# Patient Record
Sex: FEMALE | Race: WHITE | NOT HISPANIC OR LATINO | URBAN - METROPOLITAN AREA
[De-identification: names, ages, dates, MRNs, and addresses within clinical notes are randomized per-mention and may not be internally consistent; named-entity substitution may affect disease eponyms.]

---

## 2018-01-10 NOTE — PROGRESS NOTES
Assessment    1  Allergic rhinitis (477 9) (J30 9)   2  School physical exam (V70 5) (Z02 0)   3  Immunity status testing (V72 61) (Z01 84)    Plan  Immunity status testing    · (LC) Measles/Mumps/Rubella Immunity; Status:Active; Requested for:[id number];    · (Q) HEPATITIS B IMMUNITY PANEL; Status:Active; Requested for:[id number];     Discussion/Summary  health maintenance visit healthy adult female Currently, she eats an adequate diet and has an inadequate exercise regimen  Breast cancer screening: self breast exam technique was taught, monthly self breast exam was advised and breast cancer screening is not indicated  Colorectal cancer screening: colorectal cancer screening is current  Osteoporosis screening: bone mineral density testing is not indicated  The Need immunization records from previous PCP  Patient discussion: discussed with the patient  Will have patient sign consent to obtain records from previous PCP for immunization review  For school physical requirements, will check labs for immunity to MMR and Hepatitis B  Will complete physical paperwork and immunization records, emailed to me by the patient after complete records obtained and will mail to her at current address  Seasonal allergies and asthma well controlled, recommended use of nonsedating antihistamine and nasal steroid during problem seasons  Follow up yearly or as needed  Possible side effects of new medications were reviewed with the patient/guardian today  The patient was counseled regarding instructions for management, risk factor reductions, patient and family education, impressions, risks and benefits of treatment options  Chief Complaint  Patient presents for school physical      History of Present Illness  HM, Adult Female: The patient is being seen for a health maintenance evaluation  Social History: Work status: working full time and occupation: Teacher, PhD student    The patient has never smoked cigarettes and has never used smokeless tobacco  She reports never drinking alcohol  The patient has no concerns about alcohol abuse  She has never used illicit drugs  General Health: The patient's health since the last visit is described as good  She has regular dental visits  She denies vision problems  She denies hearing loss  Immunizations status: up to date  Lifestyle:  She consumes a diverse and healthy diet  She has weight concerns  She does not exercise regularly  Reproductive health: the patient is premenopausal   she reports normal menses  Screening: cancer screening reviewed and current  metabolic screening reviewed and current  risk screening reviewed and current  HPI: 28year old female presents for school physical; 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[address]; also teaching part time  Has past medical history of allergic rhinitis and asthma, both of which are well controlled  Not taking any controller medication for either condition; has some nasal congestion and sinus pressure last weekend which has since resolved, took mucinex for a few days  No rhinorrhea or purulent nasal discharge, no SOB, no cough  Seasonal Allergy (Brief): The patient is being seen for a routine clinic follow-up of seasonal allergy  Symptoms:  no itching eyes, no watery eyes, no itching nose, no runny nose, no nasal congestion, no postnasal drainage, no cough, no sneezing and no sinus pressure  The patient is currently asymptomatic  Associated symptoms:  no hives, no head congestion, no dyspnea, no fatigue and no impaired school performance  The patient is not currently being treated for this problem  Review of Systems    Constitutional: no fever, no recent weight gain, no chills and no recent weight loss  Eyes: no eyesight problems and eyes not red  ENT: no earache, no sore throat, no hearing loss, no nasal discharge and no hoarseness     Cardiovascular: no chest pain, no palpitations and no lower extremity edema  Respiratory: no shortness of breath, no cough and no wheezing  Gastrointestinal: no nausea, no vomiting and no diarrhea  Genitourinary: no unexplained vaginal bleeding  Musculoskeletal: no arthralgias and no myalgias  Integumentary: no rashes and no skin lesions  Neurological: no headache  Psychiatric: no sleep disturbances  Endocrine: no muscle weakness  Hematologic/Lymphatic: no swollen glands  Other Symptoms: All other review of systems negative  ROS reviewed  Active Problems    1  Allergic rhinitis (477 9) (J30 9)   2  Encounter for preconception consultation (V26 49) (Z31 69)   3  Encounter to establish care with new doctor (V65 8) (Z71 89)   4  Esophageal reflux (530 81) (K21 9)   5  Immunity status testing (V72 61) (Z01 84)   6  Irregular menses (626 4) (N92 6)   7  Need for immunization against influenza (V04 81) (Z23)   8  Overweight (278 02) (E66 3)   9  School physical exam (V70 5) (Z02 0)    Past Medical History    · History of Asthma (493 90) (J45 909)    Surgical History    · History of Tonsillectomy With Adenoidectomy    Family History  Mother    · No pertinent family history  Family History    · Family history of Diabetes Mellitus (V18 0)   · Family history of Hypertension (V17 49)    Social History    · Denied: History of Alcohol Use (History)   · Never A Smoker    Current Meds   1  Fish Oil OIL; Therapy: (Recorded:[id number]) to Recorded   2  One-A-Day Womens Prenatal 28-0 8 & 223 MG Oral Miscellaneous; Therapy: (Recorded:44Liq9625) to Recorded    Allergies    1  PriLOSEC OTC TBEC   2  Albuterol AERS    3   Latex    Vitals   Recorded: [id number] 11:10FX   Systolic 306   Diastolic 70   Heart Rate 72   Respiration 16   Temperature 96 8 F   Pain Scale 0   Height 5 ft 8 4 in   Weight 203 lb 3 2 oz   BMI Calculated 30 54   BSA Calculated 2 07     Physical Exam    Constitutional   General appearance: Abnormal   well developed, appears healthy, overweight and well hydrated  Head and Face   Head and face: Normal     Palpation of the face and sinuses: No sinus tenderness  Eyes   Conjunctiva and lids: No swelling, erythema or discharge  Pupils and irises: Equal, round, reactive to light  Ears, Nose, Mouth, and Throat   External inspection of ears and nose: Normal     Otoscopic examination: Tympanic membranes translucent with normal light reflex  Canals patent without erythema  Hearing: Normal     Nasal mucosa, septum, and turbinates: Normal without edema or erythema  Lips, teeth, and gums: Normal, good dentition  Oropharynx: Normal with no erythema, edema, exudate or lesions  Neck   Neck: Supple, symmetric, trachea midline, no masses  Thyroid: Normal, no thyromegaly  Pulmonary   Respiratory effort: No increased work of breathing or signs of respiratory distress  Auscultation of lungs: Clear to auscultation  Cardiovascular   Auscultation of heart: Normal rate and rhythm, normal S1 and S2, no murmurs  Peripheral vascular exam: Normal     Examination of extremities for edema and/or varicosities: Normal     Abdomen   Abdomen: Non-tender, no masses  Lymphatic   Palpation of lymph nodes in neck: No lymphadenopathy  Musculoskeletal   Gait and station: Normal     Digits and nails: Normal without clubbing or cyanosis  Range of motion: Normal     Muscle strength/tone: Normal     Skin   Skin and subcutaneous tissue: Normal without rashes or lesions  Neurologic   Cranial nerves: Cranial nerves II-XII intact  Psychiatric   Judgment and insight: Normal     Orientation to person, place, and time: Normal     Recent and remote memory: Intact  Mood and affect: Normal        Attending Note  Attending Note: Attending Note: I discussed the case with the Resident and reviewed the Resident's note, I supervised the Resident and I agree with the Resident management plan as it was presented to me   Level of Participation: I was present in clinic, but did not examine the patient  I agree with the Resident's note        Signatures   Electronically signed by : Matt Harrington DO; Sep 14 2016 11:24AM EST                       (Author)    Electronically signed by : FELICITA Gonzalez ; Sep 21 2016  2:50PM EST                       (Author)

## 2022-10-18 ENCOUNTER — OFFICE VISIT (OUTPATIENT)
Dept: FAMILY MEDICINE | Facility: CLINIC | Age: 37
End: 2022-10-18
Payer: COMMERCIAL

## 2022-10-18 VITALS — RESPIRATION RATE: 16 BRPM | WEIGHT: 283 LBS | BODY MASS INDEX: 42.89 KG/M2 | HEIGHT: 68 IN | TEMPERATURE: 97.1 F

## 2022-10-18 DIAGNOSIS — Z00.00 ENCOUNTER FOR GENERAL ADULT MEDICAL EXAMINATION WITHOUT ABNORMAL FINDINGS: Primary | ICD-10-CM

## 2022-10-18 DIAGNOSIS — Z23 IMMUNIZATION DUE: ICD-10-CM

## 2022-10-18 DIAGNOSIS — Z11.59 ENCOUNTER FOR HEPATITIS C SCREENING TEST FOR LOW RISK PATIENT: ICD-10-CM

## 2022-10-18 DIAGNOSIS — Z13.220 LIPID SCREENING: ICD-10-CM

## 2022-10-18 DIAGNOSIS — F41.8 SITUATIONAL ANXIETY: ICD-10-CM

## 2022-10-18 PROCEDURE — 99385 PREV VISIT NEW AGE 18-39: CPT | Mod: 25 | Performed by: STUDENT IN AN ORGANIZED HEALTH CARE EDUCATION/TRAINING PROGRAM

## 2022-10-18 PROCEDURE — 3008F BODY MASS INDEX DOCD: CPT | Performed by: STUDENT IN AN ORGANIZED HEALTH CARE EDUCATION/TRAINING PROGRAM

## 2022-10-18 PROCEDURE — 90686 IIV4 VACC NO PRSV 0.5 ML IM: CPT | Performed by: STUDENT IN AN ORGANIZED HEALTH CARE EDUCATION/TRAINING PROGRAM

## 2022-10-18 PROCEDURE — 90471 IMMUNIZATION ADMIN: CPT | Performed by: STUDENT IN AN ORGANIZED HEALTH CARE EDUCATION/TRAINING PROGRAM

## 2022-10-18 RX ORDER — CLINDAMYCIN AND BENZOYL PEROXIDE 1 %-5 %
KIT TOPICAL 2 TIMES DAILY
COMMUNITY

## 2022-10-18 ASSESSMENT — PAIN SCALES - GENERAL: PAINLEVEL: 0-NO PAIN

## 2022-10-18 ASSESSMENT — PATIENT HEALTH QUESTIONNAIRE - PHQ9: SUM OF ALL RESPONSES TO PHQ9 QUESTIONS 1 & 2: 0

## 2022-10-18 NOTE — ASSESSMENT & PLAN NOTE
Sheyla Estrada is a 37 y.o. female who presents today for annual physical. Age appropriate screening and routine maintenance reviewed and updated today. Will follow up in 2 weeks to review labs.

## 2022-10-18 NOTE — PROGRESS NOTES
NEW PATIENT VISIT - ANNUAL PHYSICAL    Saranya Burns D.O.  [facility]  [address]  [place] [address]  [phone]      HISTORY OF PRESENT ILLNESS        Chief Complaint   Patient presents with    Annual Exam     NP PE, wants flu vaccine today. GYN [facility] Dr Saranya Perez        HPI:  Sheyla Estrada is a 37 y.o. female here to establish care and for annual physical    Discussed heightened anxiety s/p birth of daughter a few months ago. Getting very little sleep. She describes feeling overwhelmed by care taking responsibilities of baby and managing the house. 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 She now feels highly anxious, all of the time. She is established with a therapist. Manages anxiety through meditation. Not interested in medication for anxiety. No negative feelings towards baby.    Immunizations:  Would like flu shot today  Gyn: Follows with Dr. Perez at [facility]  Pap Smear: during pregnancy, delivered 6/7/22  Contraception: infertility issues, conceived via IVF so not using contraception  Breastfeeding currently    Diet: high protein (chicken, pecans, almonds, avacados), veges, carrots and hummus, broccoli and cauliflower  Exercise: walks with baby twice per day, about 2 miles per day  Occupation: , PhD    PAST MEDICAL AND SURGICAL HISTORY        Past Medical History:   Diagnosis Date    In vitro fertilization        Past Surgical History:   Procedure Laterality Date    ADENOIDECTOMY      TONSILLECTOMY       MEDICATIONS          Current Outpatient Medications:     clindamycin-benzoyl peroxide 1-5 % gel with pump, Apply topically 2 (two) times a day., Disp: , Rfl:   ALLERGIES        Albuterol, Omeprazole, and Latex  FAMILY HISTORY        Family History  "  Problem Relation Age of Onset    Hypothyroidism Biological Sister     Breast cancer Maternal Grandmother      SOCIAL/ TOBACCO HISTORY        Social History     Tobacco Use    Smoking status: Never    Smokeless tobacco: Never   Substance Use Topics    Alcohol use: Not Currently    Drug use: Never     REVIEW OF SYSTEMS        All systems reviewed and negative except as otherwise stated in HPI   PHYSICAL EXAMINATION      Visit Vitals  Temp 36.2 °C (97.1 °F) (Temporal)   Resp 16   Ht 1.727 m (5' 8\")   Wt 128 kg (283 lb)   Breastfeeding Yes   BMI 43.03 kg/m²        Body mass index is 43.03 kg/m².     Physical Exam  Vitals reviewed.   Constitutional:       General: She is not in acute distress.     Appearance: She is not ill-appearing or toxic-appearing.   Cardiovascular:      Rate and Rhythm: Normal rate and regular rhythm.      Pulses: Normal pulses.      Heart sounds: No murmur heard.  Pulmonary:      Effort: Pulmonary effort is normal. No respiratory distress.      Breath sounds: No wheezing, rhonchi or rales.   Neurological:      Mental Status: She is alert.            ASSESSMENT AND PLAN   Diagnoses and all orders for this visit:    Encounter for general adult medical examination without abnormal findings (Primary)  Assessment & Plan:  Sheyla Estrada is a 37 y.o. female who presents today for annual physical. Age appropriate screening and routine maintenance reviewed and updated today. Will follow up in 2 weeks to review labs.    Orders:  -     Comprehensive metabolic panel; Future    Encounter for hepatitis C screening test for low risk patient  -     Hepatitis C antibody; Future    Lipid screening  -     Lipid panel; Future    Immunization due  -     Influenza vaccine quadrivalent preservative free 6 mon and older IM (FluLaval)    Situational anxiety  Assessment & Plan:  No evidence of postpartum depression.  She has high anxiety around the care of her baby and her 's health and his ability to " contribute at home, which have led to increased tension and conflict within the marriage. Her anxiety level is expected in the setting of her life stressors and is likely exacerbated by sleep deprivation and hormonal changes post pregnancy.  I encouraged her to continue therapy and agree with plan to return to marriage counseling to help improve the communication with her . I advised her to let me know if she is interested in medication in the future.        Return in about 2 weeks (around 11/1/2022) for Lab follow up.       Saranya Burns,   10/18/2022

## 2022-10-18 NOTE — ASSESSMENT & PLAN NOTE
No evidence of postpartum depression.  She has high anxiety around the care of her baby and her 's health and his ability to contribute at home, which have led to increased tension and conflict within the marriage. Her anxiety level is expected in the setting of her life stressors and is likely exacerbated by sleep deprivation and hormonal changes post pregnancy.  I encouraged her to continue therapy and agree with plan to return to marriage counseling to help improve the communication with her . I advised her to let me know if she is interested in medication in the future.

## 2022-10-23 LAB
ALBUMIN SERPL-MCNC: 4.2 G/DL (ref 3.8–4.8)
ALBUMIN/GLOB SERPL: 1.5 {RATIO} (ref 1.2–2.2)
ALP SERPL-CCNC: 121 IU/L (ref 44–121)
ALT SERPL-CCNC: 21 IU/L (ref 0–32)
AST SERPL-CCNC: 23 IU/L (ref 0–40)
BILIRUB SERPL-MCNC: 0.3 MG/DL (ref 0–1.2)
BUN SERPL-MCNC: 16 MG/DL (ref 6–20)
BUN/CREAT SERPL: 20 (ref 9–23)
CALCIUM SERPL-MCNC: 9.3 MG/DL (ref 8.7–10.2)
CHLORIDE SERPL-SCNC: 105 MMOL/L (ref 96–106)
CHOLEST SERPL-MCNC: 158 MG/DL (ref 100–199)
CO2 SERPL-SCNC: 24 MMOL/L (ref 20–29)
CREAT SERPL-MCNC: 0.82 MG/DL (ref 0.57–1)
EGFRCR SERPLBLD CKD-EPI 2021: 95 ML/MIN/1.73
GLOBULIN SER CALC-MCNC: 2.8 G/DL (ref 1.5–4.5)
GLUCOSE SERPL-MCNC: 77 MG/DL (ref 70–99)
HCV AB S/CO SERPL IA: <0.1 S/CO RATIO (ref 0–0.9)
HDLC SERPL-MCNC: 49 MG/DL
LDLC SERPL CALC-MCNC: 96 MG/DL (ref 0–99)
POTASSIUM SERPL-SCNC: 4.5 MMOL/L (ref 3.5–5.2)
PROT SERPL-MCNC: 7 G/DL (ref 6–8.5)
SODIUM SERPL-SCNC: 142 MMOL/L (ref 134–144)
TRIGL SERPL-MCNC: 68 MG/DL (ref 0–149)
VLDLC SERPL CALC-MCNC: 13 MG/DL (ref 5–40)

## 2022-11-04 ENCOUNTER — OFFICE VISIT (OUTPATIENT)
Dept: FAMILY MEDICINE | Facility: CLINIC | Age: 37
End: 2022-11-04
Payer: COMMERCIAL

## 2022-11-04 VITALS
TEMPERATURE: 98 F | SYSTOLIC BLOOD PRESSURE: 115 MMHG | OXYGEN SATURATION: 97 % | BODY MASS INDEX: 42.89 KG/M2 | WEIGHT: 283 LBS | HEIGHT: 68 IN | HEART RATE: 85 BPM | DIASTOLIC BLOOD PRESSURE: 80 MMHG | RESPIRATION RATE: 16 BRPM

## 2022-11-04 DIAGNOSIS — F41.8 SITUATIONAL ANXIETY: ICD-10-CM

## 2022-11-04 PROCEDURE — 99214 OFFICE O/P EST MOD 30 MIN: CPT | Performed by: STUDENT IN AN ORGANIZED HEALTH CARE EDUCATION/TRAINING PROGRAM

## 2022-11-04 PROCEDURE — 3008F BODY MASS INDEX DOCD: CPT | Performed by: STUDENT IN AN ORGANIZED HEALTH CARE EDUCATION/TRAINING PROGRAM

## 2022-11-04 NOTE — ASSESSMENT & PLAN NOTE
Her mood today is much improved.  She reports getting more sleep and more help from her .  She seems to be managing her many life stressors quite well at this point.  Continue to monitor her mood and anxiety.

## 2022-11-04 NOTE — ASSESSMENT & PLAN NOTE
Her weight is stable from her previous visit.  I am happy to hear that she walks 5 miles daily and has a generally healthy diet, although I hope to identify lifestyle factors that may be serving as barriers to weight loss.  I will continue to encourage her to promote weight loss and overall cardiovascular health. While she is breast-feeding, she is not a candidate for GLP-1.  Once she is no longer breast-feeding or contemplating another pregnancy, could consider a GLP-1 for her at some point.

## 2022-11-04 NOTE — PROGRESS NOTES
Saranya Burns D.O.  [facility]  [address]  [place] [address]  [phone]      HISTORY OF PRESENT ILLNESS        Chief Complaint   Patient presents with    Follow-up     Review Labwork        HPI:  Sheyla Estrada is a 37 y.o. female presents to discuss her labs and follow up anxiety.    Anxiety is significantly improved since her last office visit a little over 2 weeks ago  She is getting better sleep now and communication between her and her , Matheus, is also getting better.  She has Matheus R trying to reestablish with a marriage counselor, although with her previous therapist offices closing so looking for another marriage counselor  She feels that her  has been doing more for himself, which has been relieving some of her burden as well  Work is going well  Labs, including CMP and lipid panel are wnl    We also discussed her labs today which were all within normal limits.  CMP and lipid panel are normal.  We discussed her diet and exercise in the context of her BMI of 43.  She feels she has a fairly healthy diet, eats vegetables, does not snack.  She walks about 5 miles a day with baby, 2-1/2 miles in the morning and 2 and half miles in the evening.  She is currently breast-feeding and planning to continue breast-feeding possibly up to 18 months.  She does state that she has had weight loss success with diet and lifestyle changes prior to her pregnancy and feels she can do that again.  However she does state that she tends to plateau in that weight loss.  She and her  are not sure yet if they are going to want another child, as they would like to see how the first year goes with first baby.       Current Outpatient Medications:     clindamycin-benzoyl peroxide 1-5 % gel with pump, Apply topically 2 (two) times a day., Disp: , Rfl:      ROS        All systems reviewed and negative except as otherwise stated in HPI   PHYSICAL EXAMINATION     "  Visit Vitals  /80 (BP Location: Left upper arm, Patient Position: Sitting)   Pulse 85   Temp 36.7 °C (98 °F) (Temporal)   Resp 16   Ht 1.727 m (5' 8\")   Wt 128 kg (283 lb)   SpO2 97%   BMI 43.03 kg/m²      Body mass index is 43.03 kg/m².     Wt Readings from Last 3 Encounters:   11/04/22 128 kg (283 lb)   10/18/22 128 kg (283 lb)      BMI Readings from Last 3 Encounters:   11/04/22 43.03 kg/m²   10/18/22 43.03 kg/m²        Physical Exam  Vitals reviewed.   Constitutional:       General: She is not in acute distress.     Appearance: Normal appearance. She is not ill-appearing or toxic-appearing.   Cardiovascular:      Rate and Rhythm: Normal rate and regular rhythm.      Heart sounds: No murmur heard.  Pulmonary:      Effort: Pulmonary effort is normal. No respiratory distress.      Breath sounds: No wheezing, rhonchi or rales.   Neurological:      Mental Status: She is alert.   Psychiatric:         Mood and Affect: Mood normal.         Behavior: Behavior normal.          ASSESSMENT AND PLAN   Diagnoses and all orders for this visit:    BMI 40.0-44.9, adult (CMS/[facility]) (Primary)  Assessment & Plan:  Her weight is stable from her previous visit.  I am happy to hear that she walks 5 miles daily and has a generally healthy diet, although I hope to identify lifestyle factors that may be serving as barriers to weight loss.  I will continue to encourage her to promote weight loss and overall cardiovascular health. While she is breast-feeding, she is not a candidate for GLP-1.  Once she is no longer breast-feeding or contemplating another pregnancy, could consider a GLP-1 for her at some point.      Situational anxiety  Assessment & Plan:  Her mood today is much improved.  She reports getting more sleep and more help from her .  She seems to be managing her many life stressors quite well at this point.  Continue to monitor her mood and anxiety.         Return in about 1 year (around 11/4/2023) for annual " physical.     I spent 30 minutes on this date of service performing the following activities: obtaining history, performing examination, entering orders, documenting, preparing for visit, obtaining / reviewing records, providing counseling and education, independently reviewing study/studies and communicating results.      Saranya Burns DO  11/4/2022

## 2023-11-17 ENCOUNTER — OFFICE VISIT (OUTPATIENT)
Dept: FAMILY MEDICINE | Facility: CLINIC | Age: 38
End: 2023-11-17
Payer: COMMERCIAL

## 2023-11-17 VITALS
SYSTOLIC BLOOD PRESSURE: 110 MMHG | TEMPERATURE: 97.8 F | RESPIRATION RATE: 16 BRPM | HEART RATE: 94 BPM | DIASTOLIC BLOOD PRESSURE: 78 MMHG | BODY MASS INDEX: 42.24 KG/M2 | WEIGHT: 277.8 LBS | OXYGEN SATURATION: 99 %

## 2023-11-17 DIAGNOSIS — Z00.00 ENCOUNTER FOR GENERAL ADULT MEDICAL EXAMINATION WITHOUT ABNORMAL FINDINGS: Primary | ICD-10-CM

## 2023-11-17 DIAGNOSIS — Z13.220 LIPID SCREENING: ICD-10-CM

## 2023-11-17 DIAGNOSIS — Z01.84 IMMUNITY STATUS TESTING: ICD-10-CM

## 2023-11-17 PROCEDURE — 90686 IIV4 VACC NO PRSV 0.5 ML IM: CPT | Performed by: STUDENT IN AN ORGANIZED HEALTH CARE EDUCATION/TRAINING PROGRAM

## 2023-11-17 PROCEDURE — 99395 PREV VISIT EST AGE 18-39: CPT | Mod: 25 | Performed by: STUDENT IN AN ORGANIZED HEALTH CARE EDUCATION/TRAINING PROGRAM

## 2023-11-17 PROCEDURE — 90471 IMMUNIZATION ADMIN: CPT | Performed by: STUDENT IN AN ORGANIZED HEALTH CARE EDUCATION/TRAINING PROGRAM

## 2023-11-17 PROCEDURE — 3008F BODY MASS INDEX DOCD: CPT | Performed by: STUDENT IN AN ORGANIZED HEALTH CARE EDUCATION/TRAINING PROGRAM

## 2023-11-17 RX ORDER — ACETAMINOPHEN 500 MG
TABLET ORAL
COMMUNITY

## 2023-11-17 ASSESSMENT — PATIENT HEALTH QUESTIONNAIRE - PHQ9: SUM OF ALL RESPONSES TO PHQ9 QUESTIONS 1 & 2: 0

## 2023-11-17 NOTE — PROGRESS NOTES
ESTABLISHED PATIENT PHYSICAL EXAM    Saranya Burns D.O.  [facility]  [address]  [address]  [place] [address]  [phone]          Chief Complaint   Patient presents with    Annual Exam        HPI:  Sheyla Angel is a 38 y.o. female here for annual physical.    Reports anxiety has been better lately, feeling more herself, especially since her , Low has been making progress with weight loss and being more helpful at home.     Immunizations: would like flu, holding off on covid  Gyn: [facility] in [place]  Pap Smear: gets annually  Menses: regular  Contraception: none    Diet: carb cycling, macros  Exercise: walking with baby, but no regimented exercise  Occupation: phd,   Home: lives with  and daughter     PAST MEDICAL AND SURGICAL HISTORY        Past Medical History:   Diagnosis Date    In vitro fertilization        Past Surgical History:   Procedure Laterality Date    ADENOIDECTOMY      TONSILLECTOMY       MEDICATIONS        Current Outpatient Medications on File Prior to Visit   Medication Sig    ascorbic acid (VITAMIN C ORAL) Vitamin C    calcium carb-vitamin D3-vit K2 500 mg calcium- 200 unit-90 mcg tablet Vitamin K2-Vitamin D3    calcium carbonate (CALCIUM 500 ORAL) Calcium    clindamycin-benzoyl peroxide 1-5 % gel with pump Apply topically 2 (two) times a day.    docosahexaenoic acid/epa (FISH OIL ORAL) Fish Oil    Lactobacillus acidophilus (PROBIOTIC) 10 billion cell capsule Probiotic    prenatal no115/iron/folic acid (PRENATAL 19 ORAL) Prenatal    vit B complex 100 no.2/herbs (VITAMIN B COMPLEX 100  2-HERBS ORAL) Vitamin B Complex 100     No current facility-administered medications on file prior to visit.        ALLERGIES        Albuterol, Omeprazole, and Latex  FAMILY HISTORY        Family History   Problem Relation Age of Onset    Hypothyroidism Biological Sister     Breast cancer Maternal Grandmother      SOCIAL/  TOBACCO HISTORY        Social History     Tobacco Use    Smoking status: Never    Smokeless tobacco: Never   Substance Use Topics    Alcohol use: Not Currently    Drug use: Never     REVIEW OF SYSTEMS        All systems reviewed and negative except as otherwise stated in HPI   PHYSICAL EXAMINATION      Visit Vitals  /78 (BP Location: Right upper arm, Patient Position: Sitting)   Pulse 94   Temp 36.6 °C (97.8 °F) (Temporal)   Resp 16   Wt 126 kg (277 lb 12.8 oz)   LMP 10/23/2023 (Exact Date)   SpO2 99%   Breastfeeding Yes   BMI 42.24 kg/m²        Body mass index is 42.24 kg/m².     Wt Readings from Last 3 Encounters:   11/17/23 126 kg (277 lb 12.8 oz)   11/04/22 128 kg (283 lb)   10/18/22 128 kg (283 lb)        BMI Readings from Last 3 Encounters:   11/17/23 42.24 kg/m²   11/04/22 43.03 kg/m²   10/18/22 43.03 kg/m²        Physical Exam  Vitals reviewed.   Constitutional:       General: She is not in acute distress.     Appearance: Normal appearance. She is not ill-appearing or toxic-appearing.   HENT:      Right Ear: Tympanic membrane normal. There is no impacted cerumen.      Left Ear: Tympanic membrane normal. There is no impacted cerumen.      Mouth/Throat:      Pharynx: No oropharyngeal exudate or posterior oropharyngeal erythema.   Cardiovascular:      Rate and Rhythm: Normal rate and regular rhythm.      Heart sounds: No murmur heard.  Pulmonary:      Effort: Pulmonary effort is normal. No respiratory distress.      Breath sounds: No stridor. No wheezing, rhonchi or rales.   Abdominal:      General: There is no distension.      Palpations: Abdomen is soft.      Tenderness: There is no abdominal tenderness.   Musculoskeletal:      Right lower leg: No edema.      Left lower leg: No edema.   Neurological:      Mental Status: She is alert.      Deep Tendon Reflexes: Reflexes normal.   Psychiatric:         Mood and Affect: Mood normal.         Behavior: Behavior normal.          ASSESSMENT AND PLAN    Diagnoses and all orders for this visit:    Encounter for general adult medical examination without abnormal findings (Primary)  Assessment & Plan:  Sheyla Angel is a 38 y.o. female who presents today for annual physical. Age appropriate screening and routine maintenance reviewed and updated today.     Orders:  -     Comprehensive metabolic panel; Future    Immunity status testing  -     Hepatitis B surface antibody; Future    Lipid screening  -     Lipid panel; Future    BMI 40.0-44.9, adult (CMS/[facility])  Assessment & Plan:  Cont weight loss efforts. Aim for 30 min per day of moderate intensity activity      Other orders  -     Influenza vaccine quadrivalent preservative free 6 mon and older IM (FluLaval)         Current Outpatient Medications:     ascorbic acid (VITAMIN C ORAL), Vitamin C, Disp: , Rfl:     calcium carb-vitamin D3-vit K2 500 mg calcium- 200 unit-90 mcg tablet, Vitamin K2-Vitamin D3, Disp: , Rfl:     calcium carbonate (CALCIUM 500 ORAL), Calcium, Disp: , Rfl:     clindamycin-benzoyl peroxide 1-5 % gel with pump, Apply topically 2 (two) times a day., Disp: , Rfl:     docosahexaenoic acid/epa (FISH OIL ORAL), Fish Oil, Disp: , Rfl:     Lactobacillus acidophilus (PROBIOTIC) 10 billion cell capsule, Probiotic, Disp: , Rfl:     prenatal no115/iron/folic acid (PRENATAL 19 ORAL), Prenatal, Disp: , Rfl:     vit B complex 100 no.2/herbs (VITAMIN B COMPLEX 100  2-HERBS ORAL), Vitamin B Complex 100, Disp: , Rfl:     Return in about 1 year (around 11/17/2024) for annual physical or sooner as needed.       Saranya Burns,   11/17/2023

## 2023-11-18 LAB
ALBUMIN SERPL-MCNC: 4.5 G/DL (ref 3.9–4.9)
ALBUMIN/GLOB SERPL: 2 {RATIO} (ref 1.2–2.2)
ALP SERPL-CCNC: 91 IU/L (ref 44–121)
ALT SERPL-CCNC: 12 IU/L (ref 0–32)
AST SERPL-CCNC: 14 IU/L (ref 0–40)
BILIRUB SERPL-MCNC: 0.4 MG/DL (ref 0–1.2)
BUN SERPL-MCNC: 17 MG/DL (ref 6–20)
BUN/CREAT SERPL: 22 (ref 9–23)
CALCIUM SERPL-MCNC: 9.4 MG/DL (ref 8.7–10.2)
CHLORIDE SERPL-SCNC: 105 MMOL/L (ref 96–106)
CHOLEST SERPL-MCNC: 149 MG/DL (ref 100–199)
CO2 SERPL-SCNC: 22 MMOL/L (ref 20–29)
CREAT SERPL-MCNC: 0.79 MG/DL (ref 0.57–1)
EGFRCR SERPLBLD CKD-EPI 2021: 99 ML/MIN/1.73
GLOBULIN SER CALC-MCNC: 2.3 G/DL (ref 1.5–4.5)
GLUCOSE SERPL-MCNC: 82 MG/DL (ref 70–99)
HBV SURFACE AB SER QL: REACTIVE
HDLC SERPL-MCNC: 49 MG/DL
LDLC SERPL CALC-MCNC: 88 MG/DL (ref 0–99)
POTASSIUM SERPL-SCNC: 5.3 MMOL/L (ref 3.5–5.2)
PROT SERPL-MCNC: 6.8 G/DL (ref 6–8.5)
SODIUM SERPL-SCNC: 141 MMOL/L (ref 134–144)
TRIGL SERPL-MCNC: 57 MG/DL (ref 0–149)
VLDLC SERPL CALC-MCNC: 12 MG/DL (ref 5–40)

## 2024-06-19 ENCOUNTER — TELEPHONE (OUTPATIENT)
Dept: FAMILY MEDICINE | Facility: CLINIC | Age: 39
End: 2024-06-19
Payer: COMMERCIAL

## 2024-06-20 ENCOUNTER — TELEPHONE (OUTPATIENT)
Dept: FAMILY MEDICINE | Facility: CLINIC | Age: 39
End: 2024-06-20

## 2024-06-20 ENCOUNTER — OFFICE VISIT (OUTPATIENT)
Dept: FAMILY MEDICINE | Facility: CLINIC | Age: 39
End: 2024-06-20
Payer: COMMERCIAL

## 2024-06-20 VITALS
TEMPERATURE: 97.6 F | SYSTOLIC BLOOD PRESSURE: 110 MMHG | OXYGEN SATURATION: 99 % | BODY MASS INDEX: 40.85 KG/M2 | HEART RATE: 87 BPM | RESPIRATION RATE: 16 BRPM | DIASTOLIC BLOOD PRESSURE: 70 MMHG | HEIGHT: 69 IN | WEIGHT: 275.8 LBS

## 2024-06-20 DIAGNOSIS — A69.20 ERYTHEMA CHRONICUM MIGRANS: Primary | ICD-10-CM

## 2024-06-20 DIAGNOSIS — A69.20 ERYTHEMA CHRONICUM MIGRANS: ICD-10-CM

## 2024-06-20 DIAGNOSIS — L98.9 SKIN LESION: ICD-10-CM

## 2024-06-20 PROCEDURE — 3008F BODY MASS INDEX DOCD: CPT | Performed by: FAMILY MEDICINE

## 2024-06-20 PROCEDURE — 99214 OFFICE O/P EST MOD 30 MIN: CPT | Performed by: FAMILY MEDICINE

## 2024-06-20 RX ORDER — AMOXICILLIN 500 MG/1
500 TABLET, FILM COATED ORAL 3 TIMES DAILY
Qty: 21 TABLET | Refills: 0 | Status: SHIPPED | OUTPATIENT
Start: 2024-06-20 | End: 2024-06-20 | Stop reason: SDUPTHER

## 2024-06-20 RX ORDER — AMOXICILLIN 500 MG/1
500 TABLET, FILM COATED ORAL 3 TIMES DAILY
Qty: 21 TABLET | Refills: 0 | Status: SHIPPED | OUTPATIENT
Start: 2024-06-20 | End: 2024-07-04

## 2024-06-20 NOTE — TELEPHONE ENCOUNTER
Yes, this was my error, the quantity should be a total of 42.  Can you please clarify that with the pharmacy and ask them to provide her with another 21 tabs

## 2024-06-20 NOTE — PROGRESS NOTES
Chief Complaint  Chief Complaint   Patient presents with    Insect Bite     Pt here for having a tick bite that she noticed on Sunday and went to urgent care. Pt would like to know what she should look out for with finding out she is 5 weeks pregnant       History of Present Illness  38 yo female, here with having a tick found on her right ankle Sunday, 4 days ago.   Had a bite but not sure embedded. Had a bite rosalino. Doubts on a full 24 hours as had put lotion there day before and didn't see it. Could have been there just under.   Was seen at Urgent Care that day and given a steroid cream.  Day after developed a rash around the bite rosalino, and yesterday developed a pink rash around the left ankle area.  Is currently 5 weeks pregnant.  No fever, joint pain or constitutional complaints.  No rash elsewhere.          Current Outpatient Medications   Medication Sig Dispense Refill    amoxicillin (AMOXIL) 500 mg tablet Take 1 tablet (500 mg total) by mouth 3 (three) times a day for 14 days. 21 tablet 0    clindamycin-benzoyl peroxide 1-5 % gel with pump Apply topically 2 (two) times a day.      docosahexaenoic acid/epa (FISH OIL ORAL) Fish Oil      Lactobacillus acidophilus (PROBIOTIC) 10 billion cell capsule Probiotic      prenatal no115/iron/folic acid (PRENATAL 19 ORAL) Prenatal      vit B complex 100 no.2/herbs (VITAMIN B COMPLEX 100  2-HERBS ORAL) Vitamin B Complex 100       No current facility-administered medications for this visit.       Patient Active Problem List   Diagnosis    Encounter for general adult medical examination without abnormal findings    Situational anxiety    BMI 40.0-44.9, adult (CMS/[facility])       Past Medical History:   Diagnosis Date    In vitro fertilization        Past Surgical History:   Procedure Laterality Date    ADENOIDECTOMY      TONSILLECTOMY         Family History   Problem Relation Age of Onset    Hypothyroidism Biological Sister     Breast cancer Maternal Grandmother        Social  "History     Socioeconomic History    Marital status:      Spouse name: Not on file    Number of children: 1    Years of education: Not on file    Highest education level: Doctorate   Occupational History    Occupation:    Tobacco Use    Smoking status: Never    Smokeless tobacco: Never   Substance and Sexual Activity    Alcohol use: Not Currently    Drug use: Never    Sexual activity: Not on file   Other Topics Concern    Not on file   Social History Narrative    Not on file     Social Determinants of Health     Financial Resource Strain: Not on file   Food Insecurity: Not on file   Transportation Needs: Not on file   Physical Activity: Not on file   Stress: Not on file   Social Connections: Not on file   Intimate Partner Violence: Not on file   Housing Stability: Not on file       Allergies   Allergen Reactions    Albuterol Shortness of breath    Omeprazole Hives    Latex Rash       Review of Systems   Constitutional:         As in HPI       Vitals:    06/20/24 1048   BP: 110/70   BP Location: Right upper arm   Patient Position: Sitting   Pulse: 87   Resp: 16   Temp: 36.4 °C (97.6 °F)   TempSrc: Temporal   SpO2: 99%   Weight: 125 kg (275 lb 12.8 oz)   Height: 1.753 m (5' 9\")     Body mass index is 40.73 kg/m².    Physical Exam  Vitals and nursing note reviewed.   Constitutional:       Appearance: She is well-developed. She is not ill-appearing or toxic-appearing.   Neck:      Thyroid: No thyromegaly.      Trachea: No tracheal deviation.   Cardiovascular:      Rate and Rhythm: Normal rate.   Pulmonary:      Breath sounds: Normal breath sounds.   Musculoskeletal:      Right lower leg: No edema.      Left lower leg: No edema.   Skin:     Findings: Rash present.      Comments: There is a punctate bite site on the medial right ankle area with some mild surrounding pink discoloration and then an area of clearing around that and then a half [place] of erythematous macular eruption.  The left " medial malleolus area also has a slightly half ovoid pink macular rash as well.  No other areas of ECM noted.    Incidentally noted on skin exam is an irregularly shaped nevus in the right periscapular region with asymmetry of shape being more linear in the vertical plane and more circular underlying this.  I took a photo of this on her phone with her permission for her reference.   Psychiatric:      Comments: Appropriately anxious         Problem List Items Addressed This Visit    None  Visit Diagnoses       Erythema chronicum migrans    -  Primary    Discussed implications of Lyme disease.  Notify OB and  team.  Start amoxicillin 500 mg 3 times a day for 14 days.  Warnings reviewed.    Relevant Medications    amoxicillin (AMOXIL) 500 mg tablet    Other Relevant Orders    Lyme Disease Antibodies (IgG, IgM),    Skin lesion        Discussed atypical nature of skin lesion on her back incidentally seen.  Suggest dermatology evaluation soon.        We discussed doing Lyme testing but this could be negative throughout since such very early diagnosis has been made.  Could also repeat in 2 months.  This will likely be predominantly clinical diagnosis.  The ECM rash she has around the ankle where she had the bite is classically bull's-eye.  Counseled.    Return if symptoms worsen or fail to improve.    I spent  30 minutes on this date of service performing the following activities: obtaining history, performing examination, entering orders, documenting, and providing counseling and education.        Cheri Saunders MD  [facility]  [facility] in [place]  [address],  [address]  [place]  [address]  P: [phone]  F: [phone]

## 2024-06-20 NOTE — TELEPHONE ENCOUNTER
On call: called regarding a rash. She was bit by a tick >72 hrs. She noticed a rash today but does not look like a bulls-eye rash. She recently found out she is pregnant so unsure if she should be concerned. No other symptoms right now. Advised to make an appointment at the office tomorrow to have the rash examine to determine whether antibiotics is appropriate given she is pregnant. Pt understands and agrees with plan

## 2024-06-20 NOTE — TELEPHONE ENCOUNTER
Pt states she picked up her medication and its only a 7 day supply versus it being a 14 day supply. Pt wants to get more of the medication. Please assist

## 2024-06-25 ENCOUNTER — TELEPHONE (OUTPATIENT)
Dept: FAMILY MEDICINE | Facility: CLINIC | Age: 39
End: 2024-06-25
Payer: COMMERCIAL

## 2024-06-25 NOTE — TELEPHONE ENCOUNTER
----- Message from Kaykay Pollard RN sent at 6/25/2024  9:00 AM EDT -----  Regarding: FW: Lymes disease   Contact: [phone]    ----- Message -----  From: Sheyla Angel  Sent: 6/24/2024  11:13 PM EDT  To: [facility] Clinical Support P  Subject: Lymes disease                                    Hi Dr. Saunders,  I hope you are well!   I have a quick question about the Lymes disease. Since I Started the Amoxicillin last Thursday- should new rashes be showing up? The one near my ankle looks like it spread ( right ankle), left ankle looks raised and there looks to be a tiny one near my belly button. Should I be worried? I attached some images.     Thank you for the help!    All the best,    Sheyla

## 2024-06-25 NOTE — TELEPHONE ENCOUNTER
Please let patient know we got her message and unfortunately the images do not come across adequately for us to tell what might be causing her rash.  Sometimes we do see the rash spread a little before goes away, but I would think by tomorrow things should improve.  Please see if she is feeling ill or having fever or any other associated symptoms.  She may need to come in to have the rash looked at again since it changed.

## 2024-06-26 ENCOUNTER — OFFICE VISIT (OUTPATIENT)
Dept: FAMILY MEDICINE | Facility: CLINIC | Age: 39
End: 2024-06-26
Payer: COMMERCIAL

## 2024-06-26 ENCOUNTER — TELEPHONE (OUTPATIENT)
Dept: FAMILY MEDICINE | Facility: CLINIC | Age: 39
End: 2024-06-26
Payer: COMMERCIAL

## 2024-06-26 VITALS
DIASTOLIC BLOOD PRESSURE: 78 MMHG | SYSTOLIC BLOOD PRESSURE: 132 MMHG | BODY MASS INDEX: 40.73 KG/M2 | HEIGHT: 69 IN | OXYGEN SATURATION: 98 % | TEMPERATURE: 97.1 F | HEART RATE: 86 BPM | WEIGHT: 275 LBS | RESPIRATION RATE: 16 BRPM

## 2024-06-26 DIAGNOSIS — Z3A.01 LESS THAN 8 WEEKS GESTATION OF PREGNANCY: ICD-10-CM

## 2024-06-26 DIAGNOSIS — A69.20 LYME DISEASE: Primary | ICD-10-CM

## 2024-06-26 PROCEDURE — 3008F BODY MASS INDEX DOCD: CPT | Performed by: FAMILY MEDICINE

## 2024-06-26 PROCEDURE — 99213 OFFICE O/P EST LOW 20 MIN: CPT | Performed by: FAMILY MEDICINE

## 2024-06-26 NOTE — TELEPHONE ENCOUNTER
Please check in on patient today as she was having a rash and now on amoxicillin for presumed Lyme disease and yesterday she sent some photos where she had some other areas of rash.  I wanted to check in today to make sure she has nothing new expanding and not having any fever or other systemic symptoms.  If the rash in other areas is still prominent, I can add her onto my schedule at 1:00 this afternoon.

## 2024-06-26 NOTE — PROGRESS NOTES
Chief Complaint  Chief Complaint   Patient presents with    Rash     Both ankles.  Was seen last week and being treated with Amox for bite on right ankle.    Also has rash abdomin and left hip       History of Present Illness  39-year-old female, here for follow-up after recent diagnosis of Lyme disease.  Now 6 weeks pregnant.  Last week she started amoxicillin after noting a tick bite on the right ankle and subsequently having a rash circumferentially in a bull's-eye pattern around it and another arc like lesion on the left ankle.  She has been consistent with taking the amoxicillin and tolerating it well.  Yesterday she sent a message that she had noted some erythema on the abdomen near the bellybutton, and also on the left hip.  This started after she had been on the antibiotic a few days, and they were slightly arc like in shape with clearing beneath the arc, and she is here today for reevaluation to determine nature of these new areas.  Actually, since yesterday the 2 areas on the abdomen and the hip are much lighter and almost gone.   The rashes on the ankles are not as raised/inflamed and faint pink and a little further away from the central areas.   She had chills initially after starting treatment, but this has resolved.  She is not having any fever, joint pain, constitutional symptoms.        Current Outpatient Medications   Medication Sig Dispense Refill    amoxicillin (AMOXIL) 500 mg tablet Take 1 tablet (500 mg total) by mouth 3 (three) times a day for 14 days. 21 tablet 0    clindamycin-benzoyl peroxide 1-5 % gel with pump Apply topically 2 (two) times a day.      docosahexaenoic acid/epa (FISH OIL ORAL) Fish Oil      Lactobacillus acidophilus (PROBIOTIC) 10 billion cell capsule Probiotic      prenatal no115/iron/folic acid (PRENATAL 19 ORAL) Prenatal      vit B complex 100 no.2/herbs (VITAMIN B COMPLEX 100  2-HERBS ORAL) Vitamin B Complex 100       No current facility-administered medications for this  "visit.       Patient Active Problem List   Diagnosis    Encounter for general adult medical examination without abnormal findings    Situational anxiety    BMI 40.0-44.9, adult (CMS/[facility])       Past Medical History:   Diagnosis Date    In vitro fertilization        Past Surgical History:   Procedure Laterality Date    ADENOIDECTOMY      TONSILLECTOMY         Family History   Problem Relation Age of Onset    Hypothyroidism Biological Sister     Breast cancer Maternal Grandmother        Social History     Socioeconomic History    Marital status:      Spouse name: Not on file    Number of children: 1    Years of education: Not on file    Highest education level: Doctorate   Occupational History    Occupation:    Tobacco Use    Smoking status: Never    Smokeless tobacco: Never   Substance and Sexual Activity    Alcohol use: Not Currently    Drug use: Never    Sexual activity: Not on file   Other Topics Concern    Not on file   Social History Narrative    Not on file     Social Determinants of Health     Financial Resource Strain: Not on file   Food Insecurity: Not on file   Transportation Needs: Not on file   Physical Activity: Not on file   Stress: Not on file   Social Connections: Not on file   Intimate Partner Violence: Not on file   Housing Stability: Not on file       Allergies   Allergen Reactions    Albuterol Shortness of breath    Omeprazole Hives    Latex Rash       Review of Systems   Constitutional:         As in HPI       Vitals:    06/26/24 1250   BP: 132/78   BP Location: Right upper arm   Patient Position: Sitting   Pulse: 86   Resp: 16   Temp: 36.2 °C (97.1 °F)   TempSrc: Temporal   SpO2: 98%   Weight: 125 kg (275 lb)   Height: 1.753 m (5' 9\")     Body mass index is 40.61 kg/m².    Physical Exam  Skin:     Comments: Both of the rashes on the medial ankles are only minimally present just further out from the center of the lesions with very faint pink color and no longer very " inflamed or raised.  The area above the umbilicus is just minimally pink and slightly arc like an almost completely resolved and the 1 on the left hip area looks similar.         Problem List Items Addressed This Visit    None  Visit Diagnoses       Lyme disease    -  Primary    Less than 8 weeks gestation of pregnancy            It looks like all of her skin lesions are regressing and is not surprising she may have had 2 small areas that cropped up sometime after initiating treatment and are now resolving.  She is not having any systemic symptoms other than initial chills.  She is tolerating antibiotic therapy well and no signs that the ensuing rash would be a reaction to the amoxicillin as these are now fading and looked like partial ECM lesions.  We discussed warning signs and symptoms and update as needed throughout the antibiotic course.  We discussed duration of therapy being 14 to 21 days and given benefits/risks we were shooting for 14 days given her current pregnancy, since there is such a high success rate at 14 days of treatment and she was treated quite early.  However, if she does have a few disseminated lesions that persist in the next few days into the second week of treatment, I would then extend for the full 21 days. She will update next week.     Return if symptoms worsen or fail to improve.          Cheri Saunders MD  [facility]  Family Medicine in [place]  [address],  [address]  [place]  [address]  P: [phone]  F: [phone]

## 2024-07-15 ENCOUNTER — TELEPHONE (OUTPATIENT)
Dept: FAMILY MEDICINE | Facility: CLINIC | Age: 39
End: 2024-07-15
Payer: COMMERCIAL

## 2024-07-15 NOTE — TELEPHONE ENCOUNTER
----- Message from Silvana Taylor MA sent at 7/15/2024  8:31 AM EDT -----  Regarding: FW: Rash came back in my ankle  Contact: [phone]  Please advise  ----- Message -----  From: Sheyla Angel  Sent: 7/15/2024   6:53 AM EDT  To: [facility] Clinical Support P  Subject: Rash came back in my ankle                       Hi ,  I hope you are well. The rash on my original ankle looks like it is starting to come back. I am going to lab [facility] today to do the bloodwork as the SONNY at this point should work if it is positive for Lymes.    What would this mean if we do come back positive for Lymes?    Thank you for all the advice!    All the best,    Sheyla

## 2024-07-15 NOTE — TELEPHONE ENCOUNTER
If the rash comes back despite antibiotic, it could be that it is not a Lyme associated rash, and I would want her to see a dermatologist about it to determine the cause. Several other rashes can look like it.   It may be a good idea to schedule with a dermatologist to get them in the loop to evaluate it. Can refer to Dr Cadet.   Let's see what the Lyme testing shows as well.

## 2024-07-16 LAB

## 2024-07-17 ENCOUNTER — TELEPHONE (OUTPATIENT)
Dept: FAMILY MEDICINE | Facility: CLINIC | Age: 39
End: 2024-07-17
Payer: COMMERCIAL

## 2024-07-17 NOTE — TELEPHONE ENCOUNTER
----- Message from Dejah Rubio RN sent at 7/17/2024  9:32 AM EDT -----  Regarding: FW: Rash came back in my ankle  Contact: [phone]    ----- Message -----  From: Sheyla Angel  Sent: 7/16/2024  11:10 PM EDT  To: [facility] Clinical Support P  Subject: Rash came back in my ankle                       Thank You Dejah!    Hi Dr. Saunders,    I saw two of the antibodies came back positive for IgG. I know it’s not indicative of Lymes as we need 5 to show up. Is it possible,  that we knocked out most of the spirochetes with the first round of antibiotics and they would be lower in concentration and only illicit the IgG immune response pathway first?  Would we need to worry about post treatment Lymes?      I did make an appointment with the dermatology group.  wasn’t available for this week. They have me meeting with Dr. Alexis Spain.     Thanks again for all of the advice!    All the best,    Sheyla

## 2024-07-17 NOTE — TELEPHONE ENCOUNTER
Please advise that with the IgM being negative so early in the course of symptoms and not many IgG bands, this would be considered a negative test for Lyme disease.  Sometimes early in the course of Lyme, when the rash is present, the antibody testing will be negative.  Given her clinical presentation I would complete the course of antibiotic unless the dermatologist feels the rash is due to a different cause.  It is possible she did not have Lyme disease in the first place and the rash is caused by something else that looks like a Lyme rash, or that she had early Lyme disease and she may never get a positive Lyme test since we treated early and the rash will disappear by the time she finishes antibiotic.  Will await dermatology input on this.

## 2024-07-18 ENCOUNTER — TELEPHONE (OUTPATIENT)
Dept: FAMILY MEDICINE | Facility: CLINIC | Age: 39
End: 2024-07-18
Payer: COMMERCIAL

## 2024-07-18 DIAGNOSIS — Z3A.01 LESS THAN 8 WEEKS GESTATION OF PREGNANCY: ICD-10-CM

## 2024-07-18 DIAGNOSIS — A69.20 ERYTHEMA MIGRANS (LYME DISEASE): Primary | ICD-10-CM

## 2024-07-18 RX ORDER — AMOXICILLIN 500 MG/1
500 TABLET, FILM COATED ORAL 3 TIMES DAILY
Qty: 21 TABLET | Refills: 0 | Status: SHIPPED | OUTPATIENT
Start: 2024-07-18 | End: 2024-07-25

## 2024-07-18 NOTE — TELEPHONE ENCOUNTER
Erika with [facility] called stating Dr. Alexis Spain would like to speak with Dr. Saunders.     Dr. Spain's phone number is [phone].

## 2024-07-18 NOTE — TELEPHONE ENCOUNTER
Patient is asking if there is anything else you can do since she is pregnant.   She is asking about ID doc

## 2024-07-18 NOTE — TELEPHONE ENCOUNTER
Please let patient know I spoke to Dr. Alexis Spain, the dermatologist who evaluated her.  He felt that the small amount of redness she is having is just postinflammatory looking in nature and the photographs of the initial rash did look like they could be erythema migrans and along with a history of the tick we are in agreement with a 2 to 3-week course of amoxicillin therapy.  Another skin condition called granuloma annulare in its early stages can occasionally look like this so we should just watch over time to see if she gets any more persistent slightly raised red areas of the skin and he can reevaluate them.  The negative Lyme testing does not help us 1 way or the other in the situation.  I would suggest having a follow-up Lyme test in 2 months to see if there is any evolution to a fully positive test, but with early treatment sometimes that does not happen.  We feel that erring on the side of treatment with the amoxicillin given her suspicious looking rash initially, along with the tick exposure is the best course of action at this time.

## 2024-07-18 NOTE — TELEPHONE ENCOUNTER
If she would like to consult with an ID specialist, that would be fine, can refer to Dr. Rosaura Lewis at [place].  We will need to fax her blood test and my notes, and the recent phone messages regarding her dermatology assessment and ask his office to schedule a consult for her.

## 2024-08-23 ENCOUNTER — OFFICE VISIT (OUTPATIENT)
Dept: FAMILY MEDICINE | Facility: CLINIC | Age: 39
End: 2024-08-23
Payer: COMMERCIAL

## 2024-08-23 VITALS
BODY MASS INDEX: 42.68 KG/M2 | OXYGEN SATURATION: 98 % | DIASTOLIC BLOOD PRESSURE: 82 MMHG | TEMPERATURE: 97.8 F | HEART RATE: 81 BPM | RESPIRATION RATE: 16 BRPM | SYSTOLIC BLOOD PRESSURE: 132 MMHG | WEIGHT: 289 LBS

## 2024-08-23 DIAGNOSIS — A69.20 ERYTHEMA CHRONICUM MIGRANS: Primary | ICD-10-CM

## 2024-08-23 PROCEDURE — 99213 OFFICE O/P EST LOW 20 MIN: CPT | Performed by: STUDENT IN AN ORGANIZED HEALTH CARE EDUCATION/TRAINING PROGRAM

## 2024-08-23 PROCEDURE — 3008F BODY MASS INDEX DOCD: CPT | Performed by: STUDENT IN AN ORGANIZED HEALTH CARE EDUCATION/TRAINING PROGRAM

## 2024-08-23 RX ORDER — ASPIRIN 81 MG/1
162 TABLET ORAL DAILY
COMMUNITY
Start: 2024-08-07

## 2024-08-23 NOTE — ASSESSMENT & PLAN NOTE
Resolved status post course of amoxicillin.  Reassurance offered regarding concern for Lyme disease in the setting of pregnancy.  Counseled that there is no evidence to suggest any risk for congenital malformation.  She will repeat Lyme test per infectious disease.

## 2024-08-23 NOTE — PROGRESS NOTES
Saranya Burns D.O.  [facility]  [address]  [address]  [place] [address]  [phone]      HISTORY OF PRESENT ILLNESS        Chief Complaint   Patient presents with    Follow-up     Patient would like to discuss Tick bite/treatment; 15 weeks pregnant      HPI:  Sheyla Angel is a 39 y.o. female who presents to discuss the following.    She is here to discuss ongoing management of tick bite and concern for Lyme disease.  She had an erythema migrans rash that resolved then recurred.  She completed a total of 3 weeks of amoxicillin due to erythema migrans rash.  Initial Lyme test was negative.  Saw infectious disease, recommended repeat Lyme test, as initial test may have been done too soon.  Rash has resolved and has not reappeared since completing her course of antibiotics.  She is concerned about a diagnosis of Lyme disease due to her pregnancy.  She is 15 weeks gestational age.     Current Outpatient Medications on File Prior to Visit   Medication Sig    aspirin 81 mg enteric coated tablet Take 162 mg by mouth daily.    clindamycin-benzoyl peroxide 1-5 % gel with pump Apply topically 2 (two) times a day.    docosahexaenoic acid/epa (FISH OIL ORAL) Fish Oil    Lactobacillus acidophilus (PROBIOTIC) 10 billion cell capsule Probiotic    prenatal no115/iron/folic acid (PRENATAL 19 ORAL) Prenatal    vit B complex 100 no.2/herbs (VITAMIN B COMPLEX 100  2-HERBS ORAL) Vitamin B Complex 100     No current facility-administered medications on file prior to visit.      ROS        All systems reviewed and negative except as otherwise stated in HPI   PHYSICAL EXAMINATION      Visit Vitals  /82 (BP Location: Right upper arm, Patient Position: Sitting)   Pulse 81   Temp 36.6 °C (97.8 °F) (Temporal)   Resp 16   Wt 131 kg (289 lb)   LMP 10/23/2023 (Exact Date)   SpO2 98%   BMI 42.68 kg/m²      Body mass index is 42.68 kg/m².     Wt Readings from Last 3 Encounters:   08/23/24  131 kg (289 lb)   06/26/24 125 kg (275 lb)   06/20/24 125 kg (275 lb 12.8 oz)      BMI Readings from Last 3 Encounters:   08/23/24 42.68 kg/m²   06/26/24 40.61 kg/m²   06/20/24 40.73 kg/m²      Physical Exam  Vitals reviewed.   Constitutional:       General: She is not in acute distress.     Appearance: Normal appearance. She is not ill-appearing or toxic-appearing.   Cardiovascular:      Rate and Rhythm: Normal rate and regular rhythm.      Heart sounds: No murmur heard.  Pulmonary:      Effort: Pulmonary effort is normal. No respiratory distress.      Breath sounds: No stridor. No wheezing, rhonchi or rales.   Skin:     Findings: No rash.   Neurological:      Mental Status: She is alert.        ASSESSMENT AND PLAN   Diagnoses and all orders for this visit:    Erythema chronicum migrans (Primary)  Assessment & Plan:  Resolved status post course of amoxicillin.  Reassurance offered regarding concern for Lyme disease in the setting of pregnancy.  Counseled that there is no evidence to suggest any risk for congenital malformation.  She will repeat Lyme test per infectious disease.         Current Outpatient Medications:     aspirin 81 mg enteric coated tablet, Take 162 mg by mouth daily., Disp: , Rfl:     clindamycin-benzoyl peroxide 1-5 % gel with pump, Apply topically 2 (two) times a day., Disp: , Rfl:     docosahexaenoic acid/epa (FISH OIL ORAL), Fish Oil, Disp: , Rfl:     Lactobacillus acidophilus (PROBIOTIC) 10 billion cell capsule, Probiotic, Disp: , Rfl:     prenatal no115/iron/folic acid (PRENATAL 19 ORAL), Prenatal, Disp: , Rfl:     vit B complex 100 no.2/herbs (VITAMIN B COMPLEX 100  2-HERBS ORAL), Vitamin B Complex 100, Disp: , Rfl:      Return for Next scheduled follow-up.     I spent 30 minutes on this date of service performing the following activities: obtaining history, performing examination, entering orders, documenting, preparing for visit, obtaining / reviewing records, providing counseling and  education, and independently reviewing study/studies.    Saranya Burns DO  8/23/2024

## 2025-04-03 SDOH — ECONOMIC STABILITY: FOOD INSECURITY: WITHIN THE PAST 12 MONTHS, THE FOOD YOU BOUGHT JUST DIDN'T LAST AND YOU DIDN'T HAVE MONEY TO GET MORE.: NEVER TRUE

## 2025-04-03 SDOH — ECONOMIC STABILITY: TRANSPORTATION INSECURITY
IN THE PAST 12 MONTHS, HAS THE LACK OF TRANSPORTATION KEPT YOU FROM MEDICAL APPOINTMENTS OR FROM GETTING MEDICATIONS?: NO

## 2025-04-03 SDOH — ECONOMIC STABILITY: FOOD INSECURITY: WITHIN THE PAST 12 MONTHS, YOU WORRIED THAT YOUR FOOD WOULD RUN OUT BEFORE YOU GOT MONEY TO BUY MORE.: NEVER TRUE

## 2025-04-03 SDOH — ECONOMIC STABILITY: INCOME INSECURITY: IN THE LAST 12 MONTHS, WAS THERE A TIME WHEN YOU WERE NOT ABLE TO PAY THE MORTGAGE OR RENT ON TIME?: NO

## 2025-04-03 SDOH — ECONOMIC STABILITY: TRANSPORTATION INSECURITY
IN THE PAST 12 MONTHS, HAS LACK OF TRANSPORTATION KEPT YOU FROM MEETINGS, WORK, OR FROM GETTING THINGS NEEDED FOR DAILY LIVING?: NO

## 2025-04-03 ASSESSMENT — SOCIAL DETERMINANTS OF HEALTH (SDOH): IN THE PAST 12 MONTHS, HAS THE ELECTRIC, GAS, OIL, OR WATER COMPANY THREATENED TO SHUT OFF SERVICE IN YOUR HOME?: NO

## 2025-04-10 ENCOUNTER — OFFICE VISIT (OUTPATIENT)
Dept: FAMILY MEDICINE | Facility: CLINIC | Age: 40
End: 2025-04-10
Payer: COMMERCIAL

## 2025-04-10 VITALS — RESPIRATION RATE: 16 BRPM | BODY MASS INDEX: 41.95 KG/M2 | TEMPERATURE: 97.4 F | HEIGHT: 70 IN | WEIGHT: 293 LBS

## 2025-04-10 DIAGNOSIS — Z00.00 ANNUAL PHYSICAL EXAM: Primary | ICD-10-CM

## 2025-04-10 PROCEDURE — 99395 PREV VISIT EST AGE 18-39: CPT | Performed by: NURSE PRACTITIONER

## 2025-04-10 PROCEDURE — 3008F BODY MASS INDEX DOCD: CPT | Performed by: NURSE PRACTITIONER

## 2025-04-10 ASSESSMENT — ENCOUNTER SYMPTOMS
DIFFICULTY URINATING: 0
CHEST TIGHTNESS: 0
TROUBLE SWALLOWING: 0
CONFUSION: 0
SPEECH DIFFICULTY: 0
NAUSEA: 0
COUGH: 0
WHEEZING: 0
BRUISES/BLEEDS EASILY: 0
HEADACHES: 0
NUMBNESS: 0
FEVER: 0
DIZZINESS: 0
DECREASED CONCENTRATION: 0
ABDOMINAL PAIN: 0
VOMITING: 0
NERVOUS/ANXIOUS: 0
UNEXPECTED WEIGHT CHANGE: 0
EYE PAIN: 0
VOICE CHANGE: 0
CHILLS: 0
ABDOMINAL DISTENTION: 0
WOUND: 0
BLOOD IN STOOL: 0
SLEEP DISTURBANCE: 0
FREQUENCY: 0
MYALGIAS: 0
ARTHRALGIAS: 0
SHORTNESS OF BREATH: 0
DIARRHEA: 0
TREMORS: 0
FATIGUE: 0
PALPITATIONS: 0
WEAKNESS: 0
CONSTIPATION: 0
LIGHT-HEADEDNESS: 0

## 2025-04-10 ASSESSMENT — PATIENT HEALTH QUESTIONNAIRE - PHQ9: SUM OF ALL RESPONSES TO PHQ9 QUESTIONS 1 & 2: 0

## 2025-04-10 NOTE — PATIENT INSTRUCTIONS
Eye doctors:     [facility] in [place] and [place]  [phone]      [place] [facility], dr landry [phone]    [facility], Dr. Riley [phone]

## 2025-04-10 NOTE — PROGRESS NOTES
Reason for visit:   Chief Complaint   Patient presents with    Annual Exam       HPI  is a 40 y.o. female     HPI    Here for annual PE.     . Has a 4yo daughter, and has a 2 mo old son. 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 Breastfeeding. Healthy pregnancy. Feels well emotionally.     UTD PAP     UTD dental, goes every 4 mo     Does not routinely see optho, no vision issues but is interested in updated exam.     Exercising regularly- doing weights at home, hopes to start walking again.      Had covid just prior to delivery.     Problem List:  Patient Active Problem List    Diagnosis Date Noted    Erythema chronicum migrans 08/23/2024    BMI 40.0-44.9, adult (CMS/[facility]) 11/04/2022    Annual physical exam 10/18/2022    Situational anxiety 10/18/2022       Surgical History:  Past Surgical History   Procedure Laterality Date    Adenoidectomy      Tonsillectomy         Social History:  Social History     Social History Narrative    Not on file       Family History:  Family History   Problem Relation Name Age of Onset    Hypothyroidism Biological Sister      Breast cancer Maternal Grandmother         Allergies:  Albuterol, Omeprazole, and Latex    Current Medications:  Current Outpatient Medications   Medication Sig Dispense Refill    clindamycin-benzoyl peroxide 1-5 % gel with pump Apply topically 2 (two) times a day.      docosahexaenoic acid/epa (FISH OIL ORAL) Fish Oil      Lactobacillus acidophilus (PROBIOTIC) 10 billion cell capsule Probiotic      prenatal no115/iron/folic acid (PRENATAL 19 ORAL) Prenatal      vit B complex 100 no.2/herbs (VITAMIN B COMPLEX 100  2-HERBS ORAL) Vitamin B Complex 100       No current facility-administered medications for this visit.         Review of Systems:  Review of Systems   Constitutional:  Negative for chills, fatigue, fever and unexpected weight change.   HENT:  Negative for congestion, ear pain, hearing loss, trouble  swallowing and voice change.    Eyes:  Negative for pain and visual disturbance.   Respiratory:  Negative for cough, chest tightness, shortness of breath and wheezing.    Cardiovascular:  Negative for chest pain, palpitations and leg swelling.   Gastrointestinal:  Negative for abdominal distention, abdominal pain, blood in stool, constipation, diarrhea, nausea and vomiting.   Genitourinary:  Negative for difficulty urinating, frequency and urgency.   Musculoskeletal:  Negative for arthralgias, gait problem and myalgias.   Skin:  Negative for rash and wound.   Neurological:  Negative for dizziness, tremors, speech difficulty, weakness, light-headedness, numbness and headaches.   Hematological:  Does not bruise/bleed easily.   Psychiatric/Behavioral:  Negative for behavioral problems, confusion, decreased concentration, sleep disturbance and suicidal ideas. The patient is not nervous/anxious.        Objective     Vital Signs:  Vitals:    04/10/25 1109   Resp: 16   Temp: 36.3 °C (97.4 °F)       BMI:  Body mass index is 43.33 kg/m².     Physical Exam  Constitutional:       Appearance: Normal appearance.   HENT:      Head: Normocephalic.      Right Ear: Tympanic membrane, ear canal and external ear normal.      Left Ear: Tympanic membrane, ear canal and external ear normal.      Nose: Nose normal.      Mouth/Throat:      Mouth: Mucous membranes are moist.      Pharynx: Oropharynx is clear.   Eyes:      General: No scleral icterus.     Extraocular Movements: Extraocular movements intact.      Conjunctiva/sclera: Conjunctivae normal.      Pupils: Pupils are equal, round, and reactive to light.   Neck:      Vascular: No carotid bruit.   Cardiovascular:      Rate and Rhythm: Normal rate and regular rhythm.      Pulses: Normal pulses.      Heart sounds: No murmur heard.  Pulmonary:      Effort: Pulmonary effort is normal. No respiratory distress.      Breath sounds: Normal breath sounds. No wheezing, rhonchi or rales.    Abdominal:      General: Abdomen is flat. Bowel sounds are normal. There is no distension.      Palpations: Abdomen is soft.      Tenderness: There is no abdominal tenderness.      Hernia: No hernia is present.   Musculoskeletal:         General: No tenderness, deformity or signs of injury. Normal range of motion.      Cervical back: Normal range of motion and neck supple. No tenderness.      Right lower leg: No edema.      Left lower leg: No edema.   Skin:     General: Skin is warm and dry.      Findings: No lesion or rash.   Neurological:      General: No focal deficit present.      Mental Status: She is alert and oriented to person, place, and time. Mental status is at baseline.      Motor: No weakness.   Psychiatric:         Mood and Affect: Mood normal.         Behavior: Behavior normal.         Recent labs before today:     Lab Results   Component Value Date    CHOL 149 11/17/2023    TRIG 57 11/17/2023    HDL 49 11/17/2023    ALT 12 11/17/2023    AST 14 11/17/2023     11/17/2023    K 5.3 (H) 11/17/2023     11/17/2023    CREATININE 0.79 11/17/2023    BUN 17 11/17/2023    CO2 22 11/17/2023       Patient Instructions   Eye doctors:     [facility] in [facility]  [phone]      [facility], dr landry [phone]    [facility], Dr. Riley [phone]  If you do not hear from me regarding results in 7-10 days either via a call or the portal please call.      Problem List Items Addressed This Visit       Annual physical exam - Primary    Up to date on recommended preventive screenings  She would like labs checked this year- slip issued  Reviewed recommendation to start mammograms at age 40  F/u 1 year annual PE          Relevant Orders    CBC and differential    Comprehensive metabolic panel    Hemoglobin A1c    Lipid panel    TSH w reflex FT4    Vitamin D 25 hydroxy            MAL Malin  4/10/2025

## 2025-04-10 NOTE — ASSESSMENT & PLAN NOTE
Up to date on recommended preventive screenings  She would like labs checked this year- slip issued  Reviewed recommendation to start mammograms at age 40  F/u 1 year annual PE

## 2025-06-30 ENCOUNTER — OFFICE VISIT (OUTPATIENT)
Dept: FAMILY MEDICINE | Facility: CLINIC | Age: 40
End: 2025-06-30
Payer: COMMERCIAL

## 2025-06-30 VITALS
HEIGHT: 70 IN | RESPIRATION RATE: 16 BRPM | HEART RATE: 82 BPM | DIASTOLIC BLOOD PRESSURE: 68 MMHG | WEIGHT: 293 LBS | TEMPERATURE: 97.4 F | SYSTOLIC BLOOD PRESSURE: 114 MMHG | BODY MASS INDEX: 41.95 KG/M2 | OXYGEN SATURATION: 97 %

## 2025-06-30 DIAGNOSIS — R21 RASH: Primary | ICD-10-CM

## 2025-06-30 PROCEDURE — 99214 OFFICE O/P EST MOD 30 MIN: CPT | Performed by: STUDENT IN AN ORGANIZED HEALTH CARE EDUCATION/TRAINING PROGRAM

## 2025-06-30 PROCEDURE — 3008F BODY MASS INDEX DOCD: CPT | Performed by: STUDENT IN AN ORGANIZED HEALTH CARE EDUCATION/TRAINING PROGRAM

## 2025-06-30 NOTE — PROGRESS NOTES
Saranya Burns D.O.  [facility]  [address] [place] [address] [address]  [place] [address]  [phone]      HISTORY OF PRESENT ILLNESS                Consent obtained from patient and all parties present in the room? yes    I have obtained the consent of everyone present in the room to make an audio recording of this visit to assist me in documenting the encounter in the EMR.     Chief Complaint   Patient presents with    Rash     Patient states rash developed a week ago; both legs, patient states looked like cellulitis; does not itch      Sheyla Angel is a 40 y.o. female who presents to discuss the following.  History of Present Illness  The patient presents for evaluation of a rash on her legs and wrist pain.    She reports a rash on both legs, initially bright red like cellulitis, now faded. She mentions a tick bite last year and found a tick on her daughter's neck yesterday. There is no swelling, warmth, or fever associated with the rash. She is considering a Lyme disease test due to her daughter's tick exposure. There are no other rashes, and the rash is not itchy or painful. It is not related to shaving or sun exposure. She uses bug spray when outdoors.    The patient is experiencing knee pain from frequently standing up at night. She also has pain in her elbows and hands, possibly from holding her baby. Her right hand gave out while holding the baby. There is no swelling in her hands, but she experiences tingling into third, fourth, and fifth digits.    Current Outpatient Medications on File Prior to Visit   Medication Sig    calcium citrate-vitamin D2 250 mg-2.5 mcg (100 unit) per tablet Calcium    clindamycin-benzoyl peroxide 1-5 % gel with pump Apply topically 2 (two) times a day.    docosahexaenoic acid/epa (FISH OIL ORAL) Fish Oil    Lactobacillus acidophilus (PROBIOTIC) 10 billion cell capsule Probiotic    prenatal no115/iron/folic acid (PRENATAL 19 ORAL) Prenatal    vit  "B complex 100 no.2/herbs (VITAMIN B COMPLEX 100  2-HERBS ORAL) Vitamin B Complex 100     No current facility-administered medications on file prior to visit.      ROS  All systems reviewed and negative except as otherwise stated in HPI.    PHYSICAL EXAMINATION    Visit Vitals  Pulse 82   Temp 36.3 °C (97.4 °F) (Temporal)   Resp 16   Ht 1.778 m (5' 10\")   Wt (!) 140 kg (309 lb)   SpO2 97%   Breastfeeding Yes   BMI 44.34 kg/m²      Body mass index is 44.34 kg/m².     Wt Readings from Last 3 Encounters:   06/30/25 (!) 140 kg (309 lb)   04/10/25 (!) 137 kg (302 lb)   08/23/24 131 kg (289 lb)      BMI Readings from Last 3 Encounters:   06/30/25 44.34 kg/m²   04/10/25 43.33 kg/m²   08/23/24 42.68 kg/m²      Physical Exam    Physical Exam  Vitals reviewed.   Constitutional:       General: She is not in acute distress.     Appearance: Normal appearance. She is not ill-appearing or toxic-appearing.   Cardiovascular:      Rate and Rhythm: Normal rate and regular rhythm.      Heart sounds: No murmur heard.  Pulmonary:      Effort: Pulmonary effort is normal. No respiratory distress.      Breath sounds: No stridor. No wheezing, rhonchi or rales.   Musculoskeletal:      Right lower leg: No edema.      Left lower leg: No edema.      Comments: Tenderness at base of R thumb, positive finkelstein's, positive tinels on R hand   Skin:     Comments: No eruption on LE's bilaterally   Neurological:      Mental Status: She is alert.       Results    ASSESSMENT AND PLAN  Assessment & Plan  Bilateral leg rash:  - Mostly resolved  - Order Lyme panel  - Patient to photograph if rash recurs    Carpal tunnel syndrome of R hand  - Positive tinels  - Recommend wrist splint  - Follow up as needed    De Quervain's tenosynovitis of R hand:  - Recommend thumb spica splint  - Return if pain persists    Diagnoses and all orders for this visit:    Rash (Primary)  -     Lyme Disease Antibodies, Total and IGM w Reflex to Line Blot; Future       Current " Outpatient Medications:     calcium citrate-vitamin D2 250 mg-2.5 mcg (100 unit) per tablet, Calcium, Disp: , Rfl:     clindamycin-benzoyl peroxide 1-5 % gel with pump, Apply topically 2 (two) times a day., Disp: , Rfl:     docosahexaenoic acid/epa (FISH OIL ORAL), Fish Oil, Disp: , Rfl:     Lactobacillus acidophilus (PROBIOTIC) 10 billion cell capsule, Probiotic, Disp: , Rfl:     prenatal no115/iron/folic acid (PRENATAL 19 ORAL), Prenatal, Disp: , Rfl:     vit B complex 100 no.2/herbs (VITAMIN B COMPLEX 100  2-HERBS ORAL), Vitamin B Complex 100, Disp: , Rfl:      Return if symptoms worsen or fail to improve.     Saranya Burns DO  6/30/2025    Attestation

## 2025-07-20 LAB
25(OH)D3+25(OH)D2 SERPL-MCNC: 46.1 NG/ML (ref 30–100)
ALBUMIN SERPL-MCNC: 4.5 G/DL (ref 3.9–4.9)
ALP SERPL-CCNC: 116 IU/L (ref 44–121)
ALT SERPL-CCNC: 18 IU/L (ref 0–32)
AST SERPL-CCNC: 19 IU/L (ref 0–40)
BASOPHILS # BLD AUTO: 0.1 X10E3/UL (ref 0–0.2)
BASOPHILS NFR BLD AUTO: 1 %
BILIRUB SERPL-MCNC: 0.4 MG/DL (ref 0–1.2)
BUN SERPL-MCNC: 19 MG/DL (ref 6–20)
BUN/CREAT SERPL: 27 (ref 9–23)
CALCIUM SERPL-MCNC: 9.3 MG/DL (ref 8.7–10.2)
CHLORIDE SERPL-SCNC: 104 MMOL/L (ref 96–106)
CHOLEST SERPL-MCNC: 178 MG/DL (ref 100–199)
CO2 SERPL-SCNC: 24 MMOL/L (ref 20–29)
CREAT SERPL-MCNC: 0.71 MG/DL (ref 0.57–1)
EGFRCR SERPLBLD CKD-EPI 2021: 111 ML/MIN/1.73
EOSINOPHIL # BLD AUTO: 0.1 X10E3/UL (ref 0–0.4)
EOSINOPHIL NFR BLD AUTO: 2 %
ERYTHROCYTE [DISTWIDTH] IN BLOOD BY AUTOMATED COUNT: 13.1 % (ref 11.7–15.4)
GLOBULIN SER CALC-MCNC: 2.1 G/DL (ref 1.5–4.5)
GLUCOSE SERPL-MCNC: 80 MG/DL (ref 70–99)
HBA1C MFR BLD: 5.5 % (ref 4.8–5.6)
HCT VFR BLD AUTO: 38.9 % (ref 34–46.6)
HDLC SERPL-MCNC: 49 MG/DL
HGB BLD-MCNC: 12.5 G/DL (ref 11.1–15.9)
IMM GRANULOCYTES # BLD AUTO: 0 X10E3/UL (ref 0–0.1)
IMM GRANULOCYTES NFR BLD AUTO: 0 %
LDLC SERPL CALC-MCNC: 109 MG/DL (ref 0–99)
LYMPHOCYTES # BLD AUTO: 1.7 X10E3/UL (ref 0.7–3.1)
LYMPHOCYTES NFR BLD AUTO: 33 %
MCH RBC QN AUTO: 28.9 PG (ref 26.6–33)
MCHC RBC AUTO-ENTMCNC: 32.1 G/DL (ref 31.5–35.7)
MCV RBC AUTO: 90 FL (ref 79–97)
MONOCYTES # BLD AUTO: 0.5 X10E3/UL (ref 0.1–0.9)
MONOCYTES NFR BLD AUTO: 9 %
NEUTROPHILS # BLD AUTO: 2.8 X10E3/UL (ref 1.4–7)
NEUTROPHILS NFR BLD AUTO: 55 %
PLATELET # BLD AUTO: 258 X10E3/UL (ref 150–450)
POTASSIUM SERPL-SCNC: 4.6 MMOL/L (ref 3.5–5.2)
PROT SERPL-MCNC: 6.6 G/DL (ref 6–8.5)
RBC # BLD AUTO: 4.32 X10E6/UL (ref 3.77–5.28)
SODIUM SERPL-SCNC: 143 MMOL/L (ref 134–144)
T4 FREE SERPL-MCNC: 0.95 NG/DL (ref 0.82–1.77)
TRIGL SERPL-MCNC: 110 MG/DL (ref 0–149)
TSH SERPL DL<=0.005 MIU/L-ACNC: 1.58 UIU/ML (ref 0.45–4.5)
VLDLC SERPL CALC-MCNC: 20 MG/DL (ref 5–40)
WBC # BLD AUTO: 5.1 X10E3/UL (ref 3.4–10.8)

## 2025-07-21 LAB — B BURGDOR IGG+IGM SER QL IA: NEGATIVE
